# Patient Record
Sex: MALE | Race: BLACK OR AFRICAN AMERICAN | URBAN - METROPOLITAN AREA
[De-identification: names, ages, dates, MRNs, and addresses within clinical notes are randomized per-mention and may not be internally consistent; named-entity substitution may affect disease eponyms.]

---

## 2018-04-02 ENCOUNTER — OFFICE VISIT (OUTPATIENT)
Dept: URGENT CARE | Facility: URGENT CARE | Age: 43
End: 2018-04-02
Payer: COMMERCIAL

## 2018-04-02 VITALS
OXYGEN SATURATION: 97 % | WEIGHT: 213 LBS | SYSTOLIC BLOOD PRESSURE: 151 MMHG | DIASTOLIC BLOOD PRESSURE: 90 MMHG | TEMPERATURE: 97.8 F | HEART RATE: 75 BPM

## 2018-04-02 DIAGNOSIS — R30.0 DYSURIA: Primary | ICD-10-CM

## 2018-04-02 LAB
ALBUMIN UR-MCNC: NEGATIVE MG/DL
APPEARANCE UR: CLEAR
BILIRUB UR QL STRIP: NEGATIVE
COLOR UR AUTO: YELLOW
DEPRECATED S PYO AG THROAT QL EIA: NORMAL
GLUCOSE UR STRIP-MCNC: NEGATIVE MG/DL
HGB UR QL STRIP: NEGATIVE
KETONES UR STRIP-MCNC: NEGATIVE MG/DL
LEUKOCYTE ESTERASE UR QL STRIP: NEGATIVE
NITRATE UR QL: NEGATIVE
PH UR STRIP: 5.5 PH (ref 5–7)
SOURCE: NORMAL
SP GR UR STRIP: 1.02 (ref 1–1.03)
SPECIMEN SOURCE: NORMAL
UROBILINOGEN UR STRIP-ACNC: 0.2 EU/DL (ref 0.2–1)

## 2018-04-02 PROCEDURE — 87491 CHLMYD TRACH DNA AMP PROBE: CPT | Performed by: PHYSICIAN ASSISTANT

## 2018-04-02 PROCEDURE — 81003 URINALYSIS AUTO W/O SCOPE: CPT | Performed by: PHYSICIAN ASSISTANT

## 2018-04-02 PROCEDURE — 87591 N.GONORRHOEAE DNA AMP PROB: CPT | Performed by: PHYSICIAN ASSISTANT

## 2018-04-02 PROCEDURE — 99203 OFFICE O/P NEW LOW 30 MIN: CPT | Mod: 25 | Performed by: PHYSICIAN ASSISTANT

## 2018-04-02 PROCEDURE — 96372 THER/PROPH/DIAG INJ SC/IM: CPT | Performed by: PHYSICIAN ASSISTANT

## 2018-04-02 RX ORDER — AZITHROMYCIN 500 MG/1
1000 TABLET, FILM COATED ORAL ONCE
Qty: 2 TABLET | Refills: 0 | Status: SHIPPED | OUTPATIENT
Start: 2018-04-02 | End: 2018-04-02

## 2018-04-02 RX ORDER — CEFTRIAXONE SODIUM 250 MG/1
250 INJECTION, POWDER, FOR SOLUTION INTRAMUSCULAR; INTRAVENOUS ONCE
Qty: 2.5 ML | Refills: 0 | OUTPATIENT
Start: 2018-04-02 | End: 2018-04-02

## 2018-04-02 ASSESSMENT — PAIN SCALES - GENERAL: PAINLEVEL: NO PAIN (0)

## 2018-04-02 NOTE — LETTER
[facility]  [address] [address] [address]  Phone: [phone]   April 3, 2018      Nimisha Diaz  [address] [place] [address]          Dear Marroddyterence Prajapatipaco Joe    Your results are normal. Negative for Chlamydia and gonorrhea.    Enclosed is a copy of the results.  It was a pleasure to see you at the clinic.    If you have any questions or concerns, please call at [phone].      Sincerely,      Alexis Price PA-C/RAFFY LENZ      Results for orders placed or performed in visit on 04/02/18   *UA reflex to Microscopic and Culture ([place] and [facility] (except [place] and [place])   Result Value Ref Range    Color Urine Yellow     Appearance Urine Clear     Glucose Urine Negative NEG^Negative mg/dL    Bilirubin Urine Negative NEG^Negative    Ketones Urine Negative NEG^Negative mg/dL    Specific Gravity Urine 1.020 1.003 - 1.035    Blood Urine Negative NEG^Negative    pH Urine 5.5 5.0 - 7.0 pH    Protein Albumin Urine Negative NEG^Negative mg/dL    Urobilinogen Urine 0.2 0.2 - 1.0 EU/dL    Nitrite Urine Negative NEG^Negative    Leukocyte Esterase Urine Negative NEG^Negative    Source Midstream Urine    NEISSERIA GONORRHOEA PCR   Result Value Ref Range    Specimen Descrip Urine     N Gonorrhea PCR Negative NEG^Negative   CHLAMYDIA TRACHOMATIS PCR   Result Value Ref Range    Specimen Description Urine     Chlamydia Trachomatis PCR Negative NEG^Negative   Strep, Rapid Screen   Result Value Ref Range    Specimen Description Throat     Rapid Strep A Screen Test canceled - Lab  error

## 2018-04-02 NOTE — NURSING NOTE
Instructed patient to stay in lobby for 15-20 minutes after Rocephin shot. Pt stated that they understand and will do so.

## 2018-04-02 NOTE — PROGRESS NOTES
S: 41 yo male with dysuria x 3 days. No penile lesions. No penile DC. No back pain. Unprotected sex with partner x 1. No vomiting or diarrhea. No abdominal pain  He says he had comprehensive negative STI testing 6 months ago through a different clinic.  No prior h/o STD  History reviewed. No pertinent past medical history.      No current outpatient prescriptions on file prior to visit.  No current facility-administered medications on file prior to visit.     Social History   Substance Use Topics     Smoking status: Never Smoker     Smokeless tobacco: Never Used     Alcohol use No       ROS:  General: negative for fever  ABD: Denies abd pain  : as above    OBJECTIVE:  /90 (BP Location: Left arm, Patient Position: Chair, Cuff Size: Adult Regular)  Pulse 75  Temp 97.8  F (36.6  C) (Oral)  Wt 213 lb (96.6 kg)  SpO2 97%   General:   awake, alert, and cooperative.  NAD.   Head: Normocephalic, atraumatic.  Eyes: Conjunctiva clear, non icteric.   ABD: soft, no tenderness to palpation , no rigidity, guarding or rebound . No CVAT  Neuro: Alert and oriented - normal speech.   Results for orders placed or performed in visit on 04/02/18   *UA reflex to Microscopic and Culture ([place] and [facility] (except [place] and [place])   Result Value Ref Range    Color Urine Yellow     Appearance Urine Clear     Glucose Urine Negative NEG^Negative mg/dL    Bilirubin Urine Negative NEG^Negative    Ketones Urine Negative NEG^Negative mg/dL    Specific Gravity Urine 1.020 1.003 - 1.035    Blood Urine Negative NEG^Negative    pH Urine 5.5 5.0 - 7.0 pH    Protein Albumin Urine Negative NEG^Negative mg/dL    Urobilinogen Urine 0.2 0.2 - 1.0 EU/dL    Nitrite Urine Negative NEG^Negative    Leukocyte Esterase Urine Negative NEG^Negative    Source Midstream Urine    Strep, Rapid Screen   Result Value Ref Range    Specimen Description Throat     Rapid Strep A Screen Test canceled - Lab  error        ASSESSMENT:     ICD-10-CM    1. Dysuria R30.0 *UA reflex to Microscopic and Culture ([place] and [facility] (except [place] and [place])     NEISSERIA GONORRHOEA PCR     CHLAMYDIA TRACHOMATIS PCR     azithromycin (ZITHROMAX) 500 MG tablet     cefTRIAXone (ROCEPHIN) 250 MG injection     INJECTION INTRAMUSCULAR OR SUB-Q       PLAN: Will treat empirically .As per ordered above.  Follow up with primary care physician if not improving.  Advised about symptoms which might herald more serious problems.      Nadira Price PA-C

## 2018-04-02 NOTE — PROGRESS NOTES
"SUBJECTIVE:   Nimisha Diaz is a 42 year old male presenting with a chief complaint of   Chief Complaint   Patient presents with     STI Check       He is an established patient of [facility].    Onset of symptoms was 9 day(s) ago.  Course of illness is worsening.    Severity moderate  Current and Associated symptoms: Pt states that he has been feeling funny, with some tingling feelings as well as some burning when he urinates  Treatment measures tried include None tried.  Predisposing factors include unprotected sex.  ***      Review of Systems    No past medical history on file.  No family history on file.  No current outpatient prescriptions on file.     Social History   Substance Use Topics     Smoking status: Not on file     Smokeless tobacco: Not on file     Alcohol use Not on file       OBJECTIVE  There were no vitals taken for this visit.    Physical Exam    Labs:  No results found for this or any previous visit (from the past 24 hour(s)).    {XRay was/was not done (Optional):178780}    ASSESSMENT:    No diagnosis found.     Medical Decision Making:    Differential Diagnosis:  { Differential Choices:104185}    Serious Comorbid Conditions:  { Serious Comorbid Conditions:772755}    PLAN:    { Plan Choices:448988}    Followup:    { Followup:643527::\"If not improving or if condition worsens, follow up with your Primary Care Provider\"}    There are no Patient Instructions on file for this visit.      "

## 2018-04-02 NOTE — MR AVS SNAPSHOT
"              After Visit Summary   2018    Nimisha Diaz    MRN: [id number]           Patient Information     Date Of Birth          1975        Visit Information        Provider Department      2018 11:05 AM Nadira Price PA-C [facility]        Today's Diagnoses     Dysuria    -  1       Follow-ups after your visit        Who to contact     If you have questions or need follow up information about today's clinic visit or your schedule please contact [facility] directly at [phone].  Normal or non-critical lab and imaging results will be communicated to you by [employer], letter or phone within 4 business days after the clinic has received the results. If you do not hear from us within 7 days, please contact the clinic through [employer] or phone. If you have a critical or abnormal lab result, we will notify you by phone as soon as possible.  Submit refill requests through [employer] or call your pharmacy and they will forward the refill request to us. Please allow 3 business days for your refill to be completed.          Additional Information About Your Visit        MyChart Information     [employer] lets you send messages to your doctor, view your test results, renew your prescriptions, schedule appointments and more. To sign up, go to [email or web address] . Click on \"Log in\" on the left side of the screen, which will take you to the Welcome page. Then click on \"Sign up Now\" on the right side of the page.     You will be asked to enter the access code listed below, as well as some personal information. Please follow the directions to create your username and password.     Your access code is: [email or web address]  Expires: 2018 12:17 PM     Your access code will  in 90 days. If you need help or a new code, please call your [facility] or [phone].        Care EveryWhere ID     This is your Care EveryWhere ID. This could be used by other " organizations to access your [facility] medical records  [id number]        Your Vitals Were     Pulse Temperature Pulse Oximetry             75 97.8  F (36.6  C) (Oral) 97%          Blood Pressure from Last 3 Encounters:   04/02/18 151/90    Weight from Last 3 Encounters:   04/02/18 213 lb (96.6 kg)              We Performed the Following     *UA reflex to Microscopic and Culture ([place] and [facility] (except [place] and [place])     CHLAMYDIA TRACHOMATIS PCR     INJECTION INTRAMUSCULAR OR SUB-Q     NEISSERIA GONORRHOEA PCR     Strep, Rapid Screen          Today's Medication Changes          These changes are accurate as of 4/2/18 12:17 PM.  If you have any questions, ask your nurse or doctor.               Start taking these medicines.        Dose/Directions    azithromycin 500 MG tablet   Commonly known as:  ZITHROMAX   Used for:  Dysuria   Started by:  Nadira Price PA-C        Dose:  1000 mg   Take 2 tablets (1,000 mg) by mouth once for 1 dose   Quantity:  2 tablet   Refills:  0       cefTRIAXone 250 MG injection   Commonly known as:  ROCEPHIN   Used for:  Dysuria   Started by:  Nadira Price PA-C        Dose:  250 mg   Inject 250 mg into the muscle once for 1 dose   Quantity:  2.5 mL   Refills:  0            Where to get your medicines      These medications were sent to [facility] - [place], [place] - [address]  [address], [facility] [address]     Phone:  [phone]     azithromycin 500 MG tablet         Some of these will need a paper prescription and others can be bought over the counter.  Ask your nurse if you have questions.     You don't need a prescription for these medications     cefTRIAXone 250 MG injection                Primary Care Provider Fax #    Physician No Ref-Primary [phone]       No address on file        Equal Access to Services     ANAYELI MOE AH: jo Belcher, savita pendleton,  veto escobarharini easton'aan ah. So [facility] [phone].    ATENCIÓN: Si habla corin, tiene a [place] disposición servicios gratuitos de asistencia lingüística. Jordan al [phone].    We comply with applicable federal civil rights laws and [place] laws. We do not discriminate on the basis of race, color, national origin, age, disability, sex, sexual orientation, or gender identity.            Thank you!     Thank you for choosing [facility]  for your care. Our goal is always to provide you with excellent care. Hearing back from our patients is one way we can continue to improve our services. Please take a few minutes to complete the written survey that you may receive in the mail after your visit with us. Thank you!             Your Updated Medication List - Protect others around you: Learn how to safely use, store and throw away your medicines at www.disposemymeds.org.          This list is accurate as of 4/2/18 12:18 PM.  Always use your most recent med list.                   Brand Name Dispense Instructions for use Diagnosis    azithromycin 500 MG tablet    ZITHROMAX    2 tablet    Take 2 tablets (1,000 mg) by mouth once for 1 dose    Dysuria       cefTRIAXone 250 MG injection    ROCEPHIN    2.5 mL    Inject 250 mg into the muscle once for 1 dose    Dysuria

## 2018-04-03 LAB
C TRACH DNA SPEC QL NAA+PROBE: NEGATIVE
N GONORRHOEA DNA SPEC QL NAA+PROBE: NEGATIVE
SPECIMEN SOURCE: NORMAL
SPECIMEN SOURCE: NORMAL